# Patient Record
Sex: MALE
[De-identification: names, ages, dates, MRNs, and addresses within clinical notes are randomized per-mention and may not be internally consistent; named-entity substitution may affect disease eponyms.]

---

## 2021-06-23 PROBLEM — Z00.00 ENCOUNTER FOR PREVENTIVE HEALTH EXAMINATION: Status: ACTIVE | Noted: 2021-06-23

## 2021-06-24 ENCOUNTER — APPOINTMENT (OUTPATIENT)
Dept: OTOLARYNGOLOGY | Facility: CLINIC | Age: 61
End: 2021-06-24
Payer: COMMERCIAL

## 2021-06-24 ENCOUNTER — NON-APPOINTMENT (OUTPATIENT)
Age: 61
End: 2021-06-24

## 2021-06-24 VITALS — BODY MASS INDEX: 22.51 KG/M2 | TEMPERATURE: 94.7 F | HEIGHT: 69 IN | WEIGHT: 152 LBS

## 2021-06-24 DIAGNOSIS — Z80.9 FAMILY HISTORY OF MALIGNANT NEOPLASM, UNSPECIFIED: ICD-10-CM

## 2021-06-24 DIAGNOSIS — Z87.891 PERSONAL HISTORY OF NICOTINE DEPENDENCE: ICD-10-CM

## 2021-06-24 DIAGNOSIS — Z86.39 PERSONAL HISTORY OF OTHER ENDOCRINE, NUTRITIONAL AND METABOLIC DISEASE: ICD-10-CM

## 2021-06-24 DIAGNOSIS — R07.0 PAIN IN THROAT: ICD-10-CM

## 2021-06-24 DIAGNOSIS — M62.89 OTHER SPECIFIED DISORDERS OF MUSCLE: ICD-10-CM

## 2021-06-24 DIAGNOSIS — Z72.89 OTHER PROBLEMS RELATED TO LIFESTYLE: ICD-10-CM

## 2021-06-24 DIAGNOSIS — Z78.9 OTHER SPECIFIED HEALTH STATUS: ICD-10-CM

## 2021-06-24 PROCEDURE — 99072 ADDL SUPL MATRL&STAF TM PHE: CPT

## 2021-06-24 PROCEDURE — 31575 DIAGNOSTIC LARYNGOSCOPY: CPT

## 2021-06-24 PROCEDURE — 99203 OFFICE O/P NEW LOW 30 MIN: CPT | Mod: 25

## 2021-06-24 RX ORDER — ROSUVASTATIN CALCIUM 5 MG/1
TABLET, FILM COATED ORAL
Refills: 0 | Status: ACTIVE | COMMUNITY

## 2021-06-24 NOTE — ASSESSMENT
[FreeTextEntry1] : His primary pathology in my opinion is musculoskeletal.\par I will refer him to physical medicine and rehabilitation.\par I have also reassured him that his hospitalization for 2 weeks with many imaging studies particularly of the head would have demonstrated an incidental finding in the neck. If the palm persist or progress is repeat imaging of the neck could be considered.

## 2021-06-24 NOTE — HISTORY OF PRESENT ILLNESS
[de-identified] : Initial visit, He presents for evaluation of Left-sided throat and neck discomfort.\par He reports that in November of 2020 he spontaneously developed discomfort in his left neck. He was in [place]. He had an MRI evaluation. I reviewed the results which did not report any pathology of any kind.\par He does not smoke.\par He does not report dysphasia,\par Weight loss or hemoptysis.\par He is not a smoker but he does drink wine daily.\par He also reports that he may he had a stroke. He reports he had many imaging studies. None of them were available for review.\par

## 2021-06-24 NOTE — PROCEDURE
[de-identified] : PROCEDURE: Flexible laryngoscopy\par SURGEON: Dr. Jones\par INDICATIONS: He was unable to tolerate a mirror exam. Assess for laryngopharynx pharyngeal reflux. cough. head and neck mass. \par ANESTHESIA: The patient was placed in a sitting position.  Following application of the topical anesthetic and decongestant, exam was performed with a flexible scope.  The scope was passed along the right nasal floor to the nasopharynx.  It was then passed into the region of the middle meatus, middle turbinate, and sphenoethmoid region.  An identical procedure was performed on the left side.  The following findings were noted:\par \par The nasal musoca was healthy appearing and the septum was roughly midline. The middle meatus and sphenoethmoid recesses were clear bilaterally. The nasopharynx \par \par Nasopharynx: no masses, choanae patent, no adenoid tissue\par \par Base of tongue/vallecula: no masses or asymmetry\par Pharyngeal walls: symmetrical. No masses.\par Pyriform sinuses: no lesions or pooling of secretions.\par Epiglottis: normal. No edema or lesions.\par Aryepiglottic folds: normal. No lesions. \par Vocal cords: clear and mobile. No lesions. Airway patent.\par Arytenoids: no edema or erythema. \par Interarytenoid area: no edema, erythema or lesion.\par

## 2023-01-23 ENCOUNTER — EMERGENCY (EMERGENCY)
Facility: HOSPITAL | Age: 63
LOS: 1 days | Discharge: HOME | End: 2023-01-23
Attending: EMERGENCY MEDICINE | Admitting: EMERGENCY MEDICINE
Payer: COMMERCIAL

## 2023-01-23 VITALS
DIASTOLIC BLOOD PRESSURE: 102 MMHG | TEMPERATURE: 98 F | RESPIRATION RATE: 16 BRPM | HEIGHT: 66 IN | SYSTOLIC BLOOD PRESSURE: 148 MMHG | OXYGEN SATURATION: 98 % | HEART RATE: 93 BPM | WEIGHT: 158.07 LBS

## 2023-01-23 VITALS
SYSTOLIC BLOOD PRESSURE: 134 MMHG | RESPIRATION RATE: 17 BRPM | OXYGEN SATURATION: 98 % | DIASTOLIC BLOOD PRESSURE: 85 MMHG | HEART RATE: 67 BPM | TEMPERATURE: 98 F

## 2023-01-23 PROCEDURE — 99284 EMERGENCY DEPT VISIT MOD MDM: CPT

## 2023-01-23 PROCEDURE — 93971 EXTREMITY STUDY: CPT | Mod: 26,LT

## 2023-01-23 RX ORDER — ACETAMINOPHEN 500 MG
650 TABLET ORAL ONCE
Refills: 0 | Status: COMPLETED | OUTPATIENT
Start: 2023-01-23 | End: 2023-01-23

## 2023-01-23 RX ORDER — IBUPROFEN 200 MG
600 TABLET ORAL ONCE
Refills: 0 | Status: DISCONTINUED | OUTPATIENT
Start: 2023-01-23 | End: 2023-01-23

## 2023-01-23 NOTE — ED PROVIDER NOTE - OBJECTIVE STATEMENT
61 y/o M with PMH for a brain hemorrhage [surgery done in November 2021] presents to the ED for LLE pain x4 days. Pt reports he travels frequently between [facility] and [place]. His last flight was a return trip from [place] about 3 days ago. Pt saw his PCP Dr Goss [[phone]] who referred him to the ED to r/o DVT. Pt denies fevers, chills, CP, SOB, numbness, tingling, or weakness.

## 2023-01-23 NOTE — ED ADULT TRIAGE NOTE - CHIEF COMPLAINT QUOTE
sib dr Goss pcp, rule out dvt, hx of "clotting history", left lower leg pain for 4 days, recent multiple long haul flights to [place] , pmhx brain haemorrhage 2021, surgery nov 2021, pt very anxious at triage, no sob sib dr Goss pcp, rule out dvt, hx of "clotting history", pt unsure of what it is, left lower leg pain for 4 days, recent multiple long haul flights to [place] , pmhx brain haemorrhage 2021, surgery nov 2021, pt very anxious at triage, no sob

## 2023-01-23 NOTE — ED PROVIDER NOTE - CLINICAL SUMMARY MEDICAL DECISION MAKING FREE TEXT BOX
61 y/o M presenting with LLE pain x4 days. Exam is remarkable for Mild L proximal calf tenderness. Plan for US doppler to r/o DVT. Will reassess clinically after results have been obtained.

## 2023-01-23 NOTE — ED PROVIDER NOTE - NSFOLLOWUPINSTRUCTIONS_ED_ALL_ED_FT
Please follow up with Dr Goss.  Your ultrasound did not show a blood clot in the leg today.  If the pain persists then we can repeat the test in a few days/a week.

## 2023-01-23 NOTE — ED PROVIDER NOTE - CARE PROVIDER_API CALL
GORGE BECKER  Family [address], [place] [address]  Phone: [phone]  Fax: [phone]  Follow Up Time:

## 2023-01-23 NOTE — ED PROVIDER NOTE - PATIENT PORTAL LINK FT
You can access the FollowMyHealth Patient Portal offered by [facility] by registering at the following website: [email or web address]. By joining [employer]’s FollowMyHealth portal, you will also be able to view your health information using other applications (apps) compatible with our system.

## 2023-01-23 NOTE — ED PROVIDER NOTE - NS ED ATTENDING STATEMENT MOD
You can access the FollowMyHealth Patient Portal offered by [facility] by registering at the following website: [email or web address]. By joining [employer]’s FollowMyHealth portal, you will also be able to view your health information using other applications (apps) compatible with our system.
Attending Only

## 2023-01-23 NOTE — ED ADULT NURSE NOTE - OBJECTIVE STATEMENT
Patient endorses symptoms left calf pain,  starting on Thursday night prior to his flight from [facility] to [facility]. Patient endorses that they have flown within the last month 6 flights from  [place] to [facility] twice and [facility] to [place] once. 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 Denies back pain, chest pain, chills, congestion, diaphoresis, dizziness, fever, nausea, SOB and vomiting. Patient states that he was taking Xarelto prior to but it was D/C due to the AVM. Currently patient is taking Crestor 40mg hyperlipidemia.

## 2023-01-23 NOTE — ED ADULT NURSE NOTE - CHIEF COMPLAINT QUOTE
sib dr Goss pcp, rule out dvt, hx of "clotting history", pt unsure of what it is, left lower leg pain for 4 days, recent multiple long haul flights to [place] , pmhx brain haemorrhage 2021, surgery nov 2021, pt very anxious at triage, no sob

## 2023-01-25 DIAGNOSIS — M79.662 PAIN IN LEFT LOWER LEG: ICD-10-CM

## 2023-12-20 ENCOUNTER — APPOINTMENT (OUTPATIENT)
Dept: GASTROENTEROLOGY | Facility: CLINIC | Age: 63
End: 2023-12-20